# Patient Record
Sex: FEMALE | Race: WHITE | URBAN - METROPOLITAN AREA
[De-identification: names, ages, dates, MRNs, and addresses within clinical notes are randomized per-mention and may not be internally consistent; named-entity substitution may affect disease eponyms.]

---

## 2017-01-09 ENCOUNTER — OFFICE VISIT (OUTPATIENT)
Dept: FAMILY MEDICINE CLINIC | Facility: CLINIC | Age: 32
End: 2017-01-09

## 2017-01-09 VITALS — HEART RATE: 127 BPM | TEMPERATURE: 98 F | WEIGHT: 149.38 LBS | OXYGEN SATURATION: 99 % | RESPIRATION RATE: 18 BRPM

## 2017-01-09 DIAGNOSIS — J22 LOWER RESPIRATORY INFECTION: ICD-10-CM

## 2017-01-09 PROBLEM — Z34.90 PREGNANCY: Status: ACTIVE | Noted: 2017-01-09

## 2017-01-09 PROCEDURE — 99202 OFFICE O/P NEW SF 15 MIN: CPT | Performed by: NURSE PRACTITIONER

## 2017-01-09 RX ORDER — AZITHROMYCIN 250 MG/1
TABLET, FILM COATED ORAL
Qty: 6 TABLET | Refills: 0 | Status: SHIPPED | OUTPATIENT
Start: 2017-01-09

## 2017-01-09 NOTE — PROGRESS NOTES
CHIEF COMPLAINT:   Patient presents with:  Cough: pain with breathing        HPI:   Mariposa Spicer is a 28year old female presents for cough. Patient mentions a few weeks ago she had GI flu, felt okay.  For approximately 1 week she has had a cold, last nig is a 28year old female who presents with:     ASSESSMENT:  Lower respiratory infection    PLAN:  Meds as below. Concern of pneumonia given her hx of pneumonia in childhood.  She has pleuritic discomfort with deep breathing left side, unable to do a CXR du

## 2017-01-12 ENCOUNTER — TELEPHONE (OUTPATIENT)
Dept: FAMILY MEDICINE CLINIC | Facility: CLINIC | Age: 32
End: 2017-01-12

## 2017-01-12 NOTE — TELEPHONE ENCOUNTER
Attempted to f/u with patient, no answer per her cell, but I was able to speak with her spouse, he states patient is much improved, still with somewhat of a cough, but not as much discomfort in her lungs, she is overall doing better.

## 2021-05-03 ENCOUNTER — TELEPHONE (OUTPATIENT)
Dept: FAMILY MEDICINE CLINIC | Facility: CLINIC | Age: 36
End: 2021-05-03

## 2021-10-13 ENCOUNTER — TELEPHONE (OUTPATIENT)
Dept: FAMILY MEDICINE CLINIC | Facility: CLINIC | Age: 36
End: 2021-10-13

## 2022-06-14 NOTE — TELEPHONE ENCOUNTER
- continue PPI drip for 72 hrs after EGD (06/12)  - transition to PO PPI dosing once drip d/c  - f/u per GI Pt stated she's not a Pt of Dr Tabitha Wallace but she's 42 weeks pregnant, will be birthing at home.  Was advised by Mid-wife to make Dr aware he will be the PCP

## (undated) NOTE — MR AVS SNAPSHOT
[facility] [address]  [phone]               Thank you for choosing us for your health care visit with JORGITO Amaral.   We are glad to serve you and happy to provide you with this summary of your vi information, go to [email or web address] and click on the Sign Up Now link in the [employer] box. Enter your [employer] Activation Code exactly as it appears below along with your Zip Code and Date of Birth to complete the sign-up process.  If you do You don’t need to join a gym. Home exercises work great.  Put more priority on exercise in your life                    Visit [facility] online at  [email or web address]